# Patient Record
(demographics unavailable — no encounter records)

---

## 2024-10-14 NOTE — HISTORY OF PRESENT ILLNESS
[FreeTextEntry1] : 50y  LMP "last month" presents to establish care.      Patient was seen in the ED on 10/7 for shortness of breath and abdominal distension x2 months. She was found to have an enlarged uterus and a large adnexal mass concerning for mature vs immature ovarian teratoma. Gyn Oncology was consulted in the ED, tumor markers were collected, and patient was discharged home to follow up in Gyn Onc clinic.   Pt reports distention and abdominal discomfort starting about 2 months ago. Pt has not seen a doctor in about 15 years, however she was told recently in [facility] that she had diabetes. She moved here from [facility] 2 week ago when her mother (who is accompanying the patient today) told her to come to get better medical care for her diabetes and her growing abdomen. She states that her abdomen started growing progressively. She denies nausea, vomiting, fevers, recent weight changes, constipation, diarrhea, dysuria, incontinence. Denies abdominal pain.  GYN: Denies known history of fibroids, cysts. Reports regular menses.  She is not sure if shes ever had a pap smear. Denies hx of STI.  OBHx:  x3, SABx1, ectopic x1 s/p salpingectomy (laterality unknown ) PMH:  T2DM (diagnosed recently in [facility])  PSH: salpingectomy as above Meds: Metformin 500 QD (was prescribed in the ED, ran out of pills yesterday, reports has been tolerating without issues)  All: NKDA Fam Hx: Denies hx of ovarian, uterine, breast or colon cancer.  Social History: Denies T/E/D. Reports drinking alcohol socially every once in a while. Sexually active with 1 longtime partner, does not use contraception or barrier method. [place], [place]/7).  Health Care Maintenance: Mammogram: Never Pap Smear: Pt unsure Colonoscopy: Never  ACC: [id number]     EXAM:  MR PELVIS WAW IC   ORDERED BY: TATY MALCOLM  PROCEDURE DATE:  10/08/2024  INTERPRETATION:  CLINICAL INFORMATION: Heterogenous myometrial mass. Evaluate for Lipoleiomyosarcoma. Worsening abdominal distention.  COMPARISON: CT of the abdomen and pelvis 10/7/2024  CONTRAST/COMPLICATIONS: IV Contrast: Gadavist  8 cc administered   2 cc discarded Oral Contrast: NONE Complications: None reported at time of study completion  PROCEDURE: MRI of the pelvis was performed.  FINDINGS: UTERUS:  Intramural lesion within the right posterior uterus which measures 5.8 x 4.9 x 4.6 cm (AP by TV by CC) consistent with degenerating fibroid. Nabothian cysts. ENDOMETRIUM: 7 mm, normal thickness. JUNCTIONAL ZONE: Thickened, measuring up to 2.9 cm with multiple T2 hyperintense foci, compatible with adenomyosis.  RIGHT OVARY: Large pelvic mass, likely arising from the right adnexa measures 16.0 x 20.9 x 19.9 cm, concerning for lesion ovarian origin. Lesion demonstrates the presence of a scattered macroscopic fat component. Primary consideration is immature teratoma. Right paraovarian cyst measures up to 1.9 cm. LEFT OVARY: 2.2 x 2.9 x 0.9 cm. Simple cyst measuring up to 1.0 cm. ADNEXA: As above  BLADDER: Within normal limits.  LYMPH NODES: No pelvic lymphadenopathy.  VISUALIZED PORTIONS:  ABDOMINAL ORGANS: Within normal limits. BOWEL: Within normal limits. PERITONEUM: Small amount of pelvic ascites. No evidence of peritoneal nodularity. VESSELS: Within normal limits. ABDOMINAL WALL: Within normal limits. BONES: Within normal limits.  IMPRESSION: Large pelvic mass concerning for ilesion of right ovarian origin demonstrating the presence of scattered macroscopic fat. Primary consideration is immature teratoma. Gynecologic oncology consultation is recommended.  Fibroid uterus and evidence of adenomyosis.  --- End of Report ---  AMANDA MILAN MD; Resident Radiologist This document has been electronically signed. KARLA MONDRAGON MD; Attending Radiologist This document has been electronically signed. Oct  8 2024  3:38PM

## 2024-10-14 NOTE — PHYSICAL EXAM
[Chaperone Present] : A chaperone was present in the examining room during all aspects of the physical examination [Alert] : alert [No Acute Distress] : no acute distress [Soft] : soft [Non-tender] : non-tender [Oriented x3] : oriented x3 [Examination Of The Breasts] : a normal appearance [No Masses] : no breast masses were palpable [Labia Majora] : normal [Labia Minora] : normal [Normal] : normal [Declined] : Patient declined rectal exam [FreeTextEntry7] : large mobile mass palpated above the level of the umbilicus [FreeTextEntry6] : enlarged mobile uterus, large mobile mass palpated to above level of umbilicus

## 2024-10-14 NOTE — DISCUSSION/SUMMARY
[FreeTextEntry1] : 50y  w/ regular periods presents as follow up from the ED to establish care in GYN clinic.  Patient seen 1 week ago in the ED for new abdominal distension and found to have enlarged uterus and adnexal mass, Ddx includes mature vs immature teratoma s/p Gyn Oncology consult. Also with new diagnosis of T2DM, likely HTN with scant medical care in the last 15 years.   #adnexal mass/enlarged uterus - regular periods, pt does not report AUB - MRI as above - Tumor markers negative(10/8): AFP, , , CEA, bHCG, LDH  - Gyn Onc team emailed to facilitate close follow up for surgical planning  #well woman GYN - Pap smear/HPV collected today - G/C and STI bloodwork sent - mammogram referral provided   #T2DM, poorly controlled, new diagnosis  - A1c 9.5 - S/p Endocrinology consult in the ED, recommended starting with metformin ER 500mg BID and if tolerating increase to 1000mg ER BID, diet and lifestyle changes.  - Per patient she has been tolerating Metformin 500mg QD without any GI complaints. Reports she ran out of pills yesterday. Will prescribe  500mg BID x30 days until patient can follow up with Endocrinology. Referral provided in [employer], patient given phone number to call.  - Discussed importance of glycemic control as part of surgical planning and wound healing.   #General HCM - GI referral provided for colonoscopy - BP 160s/80s today, pt asymptomatic, PCP referral provided    D/w Dr Keon HAYES Sullivan-Santos PGY4

## 2024-10-14 NOTE — HISTORY OF PRESENT ILLNESS
[FreeTextEntry1] : 50y  LMP "last month" presents to establish care.      Patient was seen in the ED on 10/7 for shortness of breath and abdominal distension x2 months. She was found to have an enlarged uterus and a large adnexal mass concerning for mature vs immature ovarian teratoma. Gyn Oncology was consulted in the ED, tumor markers were collected, and patient was discharged home to follow up in Gyn Onc clinic.   Pt reports distention and abdominal discomfort starting about 2 months ago. Pt has not seen a doctor in about 15 years, however she was told recently in [facility] that she had diabetes. She moved here from [facility] 2 week ago when her mother (who is accompanying the patient today) told her to come to get better medical care for her diabetes and her growing abdomen. She states that her abdomen started growing progressively. She denies nausea, vomiting, fevers, recent weight changes, constipation, diarrhea, dysuria, incontinence. Denies abdominal pain.  GYN: Denies known history of fibroids, cysts. Reports regular menses.  She is not sure if shes ever had a pap smear. Denies hx of STI.  OBHx:  x3, SABx1, ectopic x1 s/p salpingectomy (laterality unknown ) PMH:  T2DM (diagnosed recently in [facility])  PSH: salpingectomy as above Meds: Metformin 500 QD (was prescribed in the ED, ran out of pills yesterday, reports has been tolerating without issues)  All: NKDA Fam Hx: Denies hx of ovarian, uterine, breast or colon cancer.  Social History: Denies T/E/D. Reports drinking alcohol socially every once in a while. Sexually active with 1 longtime partner, does not use contraception or barrier method. [place], [place]7).  Health Care Maintenance: Mammogram: Never Pap Smear: Pt unsure Colonoscopy: Never  ACC: [id number]     EXAM:  MR PELVIS WAW IC   ORDERED BY: TATY MALCOLM  PROCEDURE DATE:  10/08/2024  INTERPRETATION:  CLINICAL INFORMATION: Heterogenous myometrial mass. Evaluate for Lipoleiomyosarcoma. Worsening abdominal distention.  COMPARISON: CT of the abdomen and pelvis 10/7/2024  CONTRAST/COMPLICATIONS: IV Contrast: Gadavist  8 cc administered   2 cc discarded Oral Contrast: NONE Complications: None reported at time of study completion  PROCEDURE: MRI of the pelvis was performed.  FINDINGS: UTERUS:  Intramural lesion within the right posterior uterus which measures 5.8 x 4.9 x 4.6 cm (AP by TV by CC) consistent with degenerating fibroid. Nabothian cysts. ENDOMETRIUM: 7 mm, normal thickness. JUNCTIONAL ZONE: Thickened, measuring up to 2.9 cm with multiple T2 hyperintense foci, compatible with adenomyosis.  RIGHT OVARY: Large pelvic mass, likely arising from the right adnexa measures 16.0 x 20.9 x 19.9 cm, concerning for lesion ovarian origin. Lesion demonstrates the presence of a scattered macroscopic fat component. Primary consideration is immature teratoma. Right paraovarian cyst measures up to 1.9 cm. LEFT OVARY: 2.2 x 2.9 x 0.9 cm. Simple cyst measuring up to 1.0 cm. ADNEXA: As above  BLADDER: Within normal limits.  LYMPH NODES: No pelvic lymphadenopathy.  VISUALIZED PORTIONS:  ABDOMINAL ORGANS: Within normal limits. BOWEL: Within normal limits. PERITONEUM: Small amount of pelvic ascites. No evidence of peritoneal nodularity. VESSELS: Within normal limits. ABDOMINAL WALL: Within normal limits. BONES: Within normal limits.  IMPRESSION: Large pelvic mass concerning for ilesion of right ovarian origin demonstrating the presence of scattered macroscopic fat. Primary consideration is immature teratoma. Gynecologic oncology consultation is recommended.  Fibroid uterus and evidence of adenomyosis.  --- End of Report ---  AMANDA MILAN MD; Resident Radiologist This document has been electronically signed. KARLA MONDRAGON MD; Attending Radiologist This document has been electronically signed. Oct  8 2024  3:38PM

## 2024-11-04 NOTE — HEALTH RISK ASSESSMENT
[Poor] : ~his/her~ current health as poor [Good] : ~his/her~  mood as  good [Yes] : Yes [Monthly or less (1 pt)] : Monthly or less (1 point) [3 or 4 (1 pt)] : 3 or 4  (1 point) [Never (0 pts)] : Never (0 points) [Access to Medications] : access to medications [With Family] : lives with family [Employed] : employed [High School] : high school [] :  [Feels Safe at Home] : Feels safe at home [Fully functional (using the telephone, shopping, preparing meals, housekeeping, doing laundry, using] : Fully functional and needs no help or supervision to perform IADLs (using the telephone, shopping, preparing meals, housekeeping, doing laundry, using transportation, managing medications and managing finances) [Never] : Never [NO] : No [0] : 2) Feeling down, depressed, or hopeless: Not at all (0) [PHQ-2 Negative - No further assessment needed] : PHQ-2 Negative - No further assessment needed [de-identified] : None [Reports changes in hearing] : Reports no changes in hearing [Reports changes in vision] : Reports no changes in vision [Reports changes in dental health] : Reports no changes in dental health [FreeTextEntry2] : [place]

## 2024-11-04 NOTE — PHYSICAL EXAM
[No Acute Distress] : no acute distress [Well Nourished] : well nourished [Well Developed] : well developed [Normal Sclera/Conjunctiva] : normal sclera/conjunctiva [PERRL] : pupils equal round and reactive to light [EOMI] : extraocular movements intact [Normal Outer Ear/Nose] : the outer ears and nose were normal in appearance [No Lymphadenopathy] : no lymphadenopathy [No Respiratory Distress] : no respiratory distress  [Clear to Auscultation] : lungs were clear to auscultation bilaterally [Normal Rate] : normal rate  [Regular Rhythm] : with a regular rhythm [Normal S1, S2] : normal S1 and S2 [No Edema] : there was no peripheral edema [No CVA Tenderness] : no CVA  tenderness [No Joint Swelling] : no joint swelling [de-identified] : Missing front teeth  [de-identified] : Distended abdomen, soft and nontender  [de-identified] : acanthosis nigricans to neck

## 2024-11-04 NOTE — HEALTH RISK ASSESSMENT
[Poor] : ~his/her~ current health as poor [Good] : ~his/her~  mood as  good [Yes] : Yes [Monthly or less (1 pt)] : Monthly or less (1 point) [3 or 4 (1 pt)] : 3 or 4  (1 point) [Never (0 pts)] : Never (0 points) [Access to Medications] : access to medications [With Family] : lives with family [Employed] : employed [High School] : high school [] :  [Feels Safe at Home] : Feels safe at home [Fully functional (using the telephone, shopping, preparing meals, housekeeping, doing laundry, using] : Fully functional and needs no help or supervision to perform IADLs (using the telephone, shopping, preparing meals, housekeeping, doing laundry, using transportation, managing medications and managing finances) [Never] : Never [NO] : No [0] : 2) Feeling down, depressed, or hopeless: Not at all (0) [PHQ-2 Negative - No further assessment needed] : PHQ-2 Negative - No further assessment needed [de-identified] : None [Reports changes in hearing] : Reports no changes in hearing [Reports changes in vision] : Reports no changes in vision [Reports changes in dental health] : Reports no changes in dental health [FreeTextEntry2] : [place]

## 2024-11-04 NOTE — HISTORY OF PRESENT ILLNESS
[FreeTextEntry1] : Establish Care [de-identified] : Kashmir Huerta is a 50 year old female with PMH of DM who presents to clinic to establish care. Patient referred by OB/GYN provider for establishing care and management of DM/HTN. Patient reports she came to [place] for visiting about 2 weeks ago. Patient states she went to the Emergency Department because of shortness of breath and abdominal distension. Patient was found to have adnexal mass and seen by Gynecology. She has follow up appointment scheduled with Gyn Oncology for surgical evaluation. Patient does not take any prescribed medications. She reports a history of hypertension on mother's side and diabetes on her fathers side. She reports having a tubal ligation prior, no other surgeries. She denies any tobacco or recreational drug use.

## 2024-11-04 NOTE — END OF VISIT
[] : Resident [FreeTextEntry3] : Pt seen with  in the room. Reports many months with abdominal symptoms. Bowels are normal, 2 times daily, a bit loose. Still menstruating, in fact reports currently with her menses, and no menopausal symptoms. On exam, NAD, obesity, abdominal exam reveals distention, although soft, with suggestion of right infraumbilical fullness, cannot palpate a discreet mass, non-tender to palpation, remainder of exam WNL. Agree with plan, will wor tko control BP and DM prior to planned surgical procedure with quick titration of medication.

## 2024-11-04 NOTE — REVIEW OF SYSTEMS
[Fever] : no fever [Chills] : no chills [Discharge] : no discharge [Vision Problems] : no vision problems [Earache] : no earache [Hearing Loss] : no hearing loss [Nasal Discharge] : no nasal discharge [Chest Pain] : no chest pain [Lower Ext Edema] : no lower extremity edema [Shortness Of Breath] : no shortness of breath [Cough] : no cough [Dyspnea on Exertion] : no dyspnea on exertion [Abdominal Pain] : abdominal pain [Constipation] : no constipation [Diarrhea] : diarrhea [Vomiting] : no vomiting [Dysuria] : no dysuria [Hematuria] : no hematuria [Joint Pain] : no joint pain [Muscle Pain] : no muscle pain [Itching] : no itching [Skin Rash] : no skin rash

## 2024-11-04 NOTE — HEALTH RISK ASSESSMENT
[Poor] : ~his/her~ current health as poor [Good] : ~his/her~  mood as  good [Yes] : Yes [Monthly or less (1 pt)] : Monthly or less (1 point) [3 or 4 (1 pt)] : 3 or 4  (1 point) [Never (0 pts)] : Never (0 points) [Access to Medications] : access to medications [With Family] : lives with family [Employed] : employed [High School] : high school [] :  [Feels Safe at Home] : Feels safe at home [Fully functional (using the telephone, shopping, preparing meals, housekeeping, doing laundry, using] : Fully functional and needs no help or supervision to perform IADLs (using the telephone, shopping, preparing meals, housekeeping, doing laundry, using transportation, managing medications and managing finances) [Never] : Never [NO] : No [0] : 2) Feeling down, depressed, or hopeless: Not at all (0) [PHQ-2 Negative - No further assessment needed] : PHQ-2 Negative - No further assessment needed [de-identified] : None [Reports changes in hearing] : Reports no changes in hearing [Reports changes in vision] : Reports no changes in vision [Reports changes in dental health] : Reports no changes in dental health [FreeTextEntry2] : [place]

## 2024-11-04 NOTE — HISTORY OF PRESENT ILLNESS
[FreeTextEntry1] : Establish Care [de-identified] : Kashmir Huerta is a 50 year old female with PMH of DM who presents to clinic to establish care. Patient referred by OB/GYN provider for establishing care and management of DM/HTN. Patient reports she came to [place] for visiting about 2 weeks ago. Patient states she went to the Emergency Department because of shortness of breath and abdominal distension. Patient was found to have adnexal mass and seen by Gynecology. She has follow up appointment scheduled with Gyn Oncology for surgical evaluation. Patient does not take any prescribed medications. She reports a history of hypertension on mother's side and diabetes on her fathers side. She reports having a tubal ligation prior, no other surgeries. She denies any tobacco or recreational drug use.

## 2024-11-04 NOTE — ASSESSMENT
[FreeTextEntry1] : Case discussed with Dr. Valdes  RTC in 2 weeks for DM follow up and 5 weeks with Dr. Casanova for follow up  Kal Casanova  Internal Medicine PGY-2 [employer] [facility]

## 2024-11-04 NOTE — PHYSICAL EXAM
[No Acute Distress] : no acute distress [Well Nourished] : well nourished [Well Developed] : well developed [Normal Sclera/Conjunctiva] : normal sclera/conjunctiva [PERRL] : pupils equal round and reactive to light [EOMI] : extraocular movements intact [Normal Outer Ear/Nose] : the outer ears and nose were normal in appearance [No Lymphadenopathy] : no lymphadenopathy [No Respiratory Distress] : no respiratory distress  [Clear to Auscultation] : lungs were clear to auscultation bilaterally [Normal Rate] : normal rate  [Regular Rhythm] : with a regular rhythm [Normal S1, S2] : normal S1 and S2 [No Edema] : there was no peripheral edema [No CVA Tenderness] : no CVA  tenderness [No Joint Swelling] : no joint swelling [de-identified] : Missing front teeth  [de-identified] : Distended abdomen, soft and nontender  [de-identified] : acanthosis nigricans to neck

## 2024-11-04 NOTE — PHYSICAL EXAM
[No Acute Distress] : no acute distress [Well Nourished] : well nourished [Well Developed] : well developed [Normal Sclera/Conjunctiva] : normal sclera/conjunctiva [PERRL] : pupils equal round and reactive to light [EOMI] : extraocular movements intact [Normal Outer Ear/Nose] : the outer ears and nose were normal in appearance [No Lymphadenopathy] : no lymphadenopathy [No Respiratory Distress] : no respiratory distress  [Clear to Auscultation] : lungs were clear to auscultation bilaterally [Normal Rate] : normal rate  [Regular Rhythm] : with a regular rhythm [Normal S1, S2] : normal S1 and S2 [No Edema] : there was no peripheral edema [No CVA Tenderness] : no CVA  tenderness [No Joint Swelling] : no joint swelling [de-identified] : Missing front teeth  [de-identified] : Distended abdomen, soft and nontender  [de-identified] : acanthosis nigricans to neck

## 2024-11-06 NOTE — REVIEW OF SYSTEMS
[Diabetes] : diabetes mellitus [Negative] : Musculoskeletal [Hot Flashes] : no hot flashes [Dysuria] : no dysuria [Vaginal Discharge] : no vaginal discharge [Abn Vag Bleeding] : no abnormal vaginal bleeding [Incontinence] : no incontinence [Recent Wt Loss___ Lbs] : no recent weight loss [Chest Pain] : no chest pain [Wheezing] : no wheezing [SOB on Exertion] : no shortness of breath during exertion [Diarrhea] : no diarrhea [Nausea] : no nausea/vomitting

## 2024-11-06 NOTE — ASSESSMENT
[FreeTextEntry1] : 50y  w/ regular periods presents to establish care in GYN ONC clinic for surgical management of enlarged uterus and adnexal mass/pelvicmass  #Adnexal/pelvic mass - MRI as above - Tumor markers within normal limits (10/8): AFP, , , CEA, bhCG, LDH - Booking sheet completed for exploratory laparotomy, total abdominal hysterectomy, bilateral salpingoophorectomy, possible staging.  - Will require medical clearance by PCP for T2DM and HTN  Evaluated with HERNÁN Stover and Dr. Goldberg Amy Ng, PGY-2

## 2024-11-06 NOTE — PAST MEDICAL HISTORY
[Menstruating] : The patient is menstruating [Regular Cycle Intervals] : have been regular [Total Preg ___] : G[unfilled] [Live Births ___] : P[unfilled]  [Ectopics ___] : ectopic pregnancies: [unfilled]  [FreeTextEntry2] : s/p open Salpingectomy (laterality unknown)

## 2024-11-06 NOTE — HISTORY OF PRESENT ILLNESS
[FreeTextEntry1] : 50y  (LMP last week) presenting as new patient for surgical management of R complex mass found on MRI Pelvis during ED visit 10/7/24.  Originally presented to the ED on 10/7 for shortness of breath and abdominal distension x2 months.  She was found to have symptomatic anemia Hgb 7.2 for which she received 1 u pRBC. Imaging significant enlarged uterus and a large adnexal mass concerning for mature vs immature ovarian teratoma (see imaging report below).  Gyn Oncology was consulted in the ED, tumor markers were collected and were largely WNL (see below), and patient was referred to this clinic.  Pt reports daily left-sided abdominal pain rated 8/10.  Denies any exacerbating factors.  Relieved with Tylenol PRN.  Denies urinary or GI complaints.  Denies fevers, chills, recent weight loss, night sweats, nausea, vomiting.    Of note, the patient lives in [facility] and is visiting the [place] for treatment of her abdominal pain. Has not seen a doctor/GYN in 15 years.  GYN: Denies known history of fibroids, cysts.  Reports regular menses.  She is not sure if shes ever had a pap smear. Denies hx of STI. OBHx:  x3 (, , '10),  Ectopic x1 s/p open salpingectomy (laterality unknown ) PMH: HTN, T2DM, KENYON  PSH: Open salpingectomy (laterality unknown) Meds: Metformin 500 BID, Amlodipine 5mg QD, Fe All: Advil (angioedema) Fam Hx: Denies hx of ovarian, uterine, breast or colon cancer. Social History: Denies T/E/D.  Reports drinking alcohol socially every once in a while.  Sexually active with 1 longtime partner, does not use contraception or barrier method. [place] recently came [place] ([place]esterday, 10/7).  Health Care Maintenance: Mammogram: Referral provided by PCP (has yet to make appointment) Pap Smear: NILM/neg HRHPV (10/14/24) Colonoscopy: Referral provided by PCP (has yet to make appointment)  ACC: [id number] EXAM: MR PELVIS WAW IC ORDERED BY: TATY MALCOLM  PROCEDURE DATE: 10/08/2024  INTERPRETATION: CLINICAL INFORMATION: Heterogenous myometrial mass. Evaluate for Lipoleiomyosarcoma. Worsening abdominal distention.  COMPARISON: CT of the abdomen and pelvis 10/7/2024  CONTRAST/COMPLICATIONS: IV Contrast: Gadavist 8 cc administered 2 cc discarded Oral Contrast: NONE Complications: None reported at time of study completion  PROCEDURE: MRI of the pelvis was performed.  FINDINGS: UTERUS: Intramural lesion within the right posterior uterus which measures 5.8 x 4.9 x 4.6 cm (AP by TV by CC) consistent with degenerating fibroid. Nabothian cysts. ENDOMETRIUM: 7 mm, normal thickness. JUNCTIONAL ZONE: Thickened, measuring up to 2.9 cm with multiple T2 hyperintense foci, compatible with adenomyosis.  RIGHT OVARY: Large pelvic mass, likely arising from the right adnexa measures 16.0 x 20.9 x 19.9 cm, concerning for lesion ovarian origin. Lesion demonstrates the presence of a scattered macroscopic fat component. Primary consideration is immature teratoma. Right paraovarian cyst measures up to 1.9 cm. LEFT OVARY: 2.2 x 2.9 x 0.9 cm. Simple cyst measuring up to 1.0 cm. ADNEXA: As above  BLADDER: Within normal limits.  LYMPH NODES: No pelvic lymphadenopathy.  VISUALIZED PORTIONS:  ABDOMINAL ORGANS: Within normal limits. BOWEL: Within normal limits. PERITONEUM: Small amount of pelvic ascites. No evidence of peritoneal nodularity. VESSELS: Within normal limits. ABDOMINAL WALL: Within normal limits. BONES: Within normal limits.  IMPRESSION: Large pelvic mass concerning for ilesion of right ovarian origin demonstrating the presence of scattered macroscopic fat. Primary consideration is immature teratoma. Gynecologic oncology consultation is recommended.  Fibroid uterus and evidence of adenomyosis.  --- End of Report ---  AMANDA MILAN MD; Resident Radiologist This document has been electronically signed. KARLA MONDRAGON MD; Attending Radiologist This document has been electronically signed. Oct 8 2024 3:38PM  Tumor Markers (10/8/2024): - AFP: 2.4 - CA-125: 25 - CA 19-9: 9 - CEA: 1.1 - bHCG: <1 - LDH: 171

## 2024-11-14 NOTE — PHYSICAL EXAM
[Soft] : abdomen soft [Non Tender] : non-tender [Normal] : affect was normal and insight and judgment were intact [de-identified] : distended

## 2024-11-14 NOTE — ASSESSMENT
[Patient Optimized for Surgery] : Patient optimized for surgery [No Further Testing Recommended] : no further testing recommended [Modify medications prior to procedure] : Modify medications prior to procedure [FreeTextEntry7] : Hold metformin on day of procedure , hold Jardiance 5 days before, Avoid NSAID, MVI 1 week

## 2024-11-14 NOTE — PHYSICAL EXAM
[Soft] : abdomen soft [Non Tender] : non-tender [Normal] : affect was normal and insight and judgment were intact [de-identified] : distended

## 2024-11-14 NOTE — HISTORY OF PRESENT ILLNESS
[No Pertinent Cardiac History] : no history of aortic stenosis, atrial fibrillation, coronary artery disease, recent myocardial infarction, or implantable device/pacemaker [No Pertinent Pulmonary History] : no history of asthma, COPD, sleep apnea, or smoking [No Adverse Anesthesia Reaction] : no adverse anesthesia reaction in self or family member [Diabetes] : diabetes [(Patient denies any chest pain, claudication, dyspnea on exertion, orthopnea, palpitations or syncope)] : Patient denies any chest pain, claudication, dyspnea on exertion, orthopnea, palpitations or syncope [Excellent (>10 METs)] : Excellent (>10 METs) [Parent] : parent [Family Member] : no family member with adverse anesthesia reaction/sudden death [Self] : no previous adverse anesthesia reaction [Chronic Anticoagulation] : no chronic anticoagulation [Chronic Kidney Disease] : no chronic kidney disease [FreeTextEntry1] : Ex lap [FreeTextEntry2] : 11/25/24 [FreeTextEntry4] : 50yF w/ PMHx of Anemia, T2DM, HTN presenting for pre-op clearance for surgical management of R complex mass found on MRI Pelvis during ED visit 10/7/24. Patient states overall she has been feeling well, tolerating amlodipine and Metformin well. Presented to the ED on 10/7 for shortness of breath and abdominal distension x2 months and found to have symptomatic anemia Hgb 7.2 for which she received 1 u pRBC. 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 [FreeTextEntry7] : EKG completed 10/7/24 unremarkable

## 2024-11-14 NOTE — END OF VISIT
[] : Resident [FreeTextEntry3] : This is a 50yF w/ PMHx of Anemia, T2DM, HTN , R complex mass found on MRI Pelvis- 10/2024 seen for medical optimization  KENYON - will need perioperative blood transfusion  DM - hold Jardiance 5 days before surgery , Hold metformin on day of surgery  HTN cont amlodpine 5 add losartan 25 qd  -PT at optimal condition for proposed procedure

## 2024-12-05 NOTE — REVIEW OF SYSTEMS
[Fever] : no fever [Chills] : no chills [Night Sweats] : no night sweats [Discharge] : no discharge [Vision Problems] : no vision problems [Earache] : no earache [Nasal Discharge] : no nasal discharge [Sore Throat] : no sore throat [Chest Pain] : no chest pain [Palpitations] : no palpitations [Shortness Of Breath] : no shortness of breath [Cough] : no cough [Dyspnea on Exertion] : no dyspnea on exertion [Abdominal Pain] : no abdominal pain [Nausea] : no nausea [Constipation] : no constipation [Vomiting] : no vomiting [Dysuria] : no dysuria [Hematuria] : no hematuria [Joint Pain] : no joint pain [Muscle Pain] : no muscle pain [Itching] : no itching [Skin Rash] : no skin rash

## 2024-12-05 NOTE — PHYSICAL EXAM
[No Acute Distress] : no acute distress [Well Nourished] : well nourished [Normal Sclera/Conjunctiva] : normal sclera/conjunctiva [PERRL] : pupils equal round and reactive to light [EOMI] : extraocular movements intact [Normal Outer Ear/Nose] : the outer ears and nose were normal in appearance [Normal Oropharynx] : the oropharynx was normal [Supple] : supple [No Respiratory Distress] : no respiratory distress  [Clear to Auscultation] : lungs were clear to auscultation bilaterally [Normal Rate] : normal rate  [Regular Rhythm] : with a regular rhythm [No Edema] : there was no peripheral edema [Soft] : abdomen soft [Non Tender] : non-tender [No Joint Swelling] : no joint swelling [No Rash] : no rash [de-identified] : Patient s/p ex-lap with staples present down from umbilicus. Skin is clean, dry and intact

## 2024-12-05 NOTE — PHYSICAL EXAM
[No Acute Distress] : no acute distress [Well Nourished] : well nourished [Normal Sclera/Conjunctiva] : normal sclera/conjunctiva [PERRL] : pupils equal round and reactive to light [EOMI] : extraocular movements intact [Normal Outer Ear/Nose] : the outer ears and nose were normal in appearance [Normal Oropharynx] : the oropharynx was normal [Supple] : supple [No Respiratory Distress] : no respiratory distress  [Clear to Auscultation] : lungs were clear to auscultation bilaterally [Normal Rate] : normal rate  [Regular Rhythm] : with a regular rhythm [No Edema] : there was no peripheral edema [Soft] : abdomen soft [Non Tender] : non-tender [No Joint Swelling] : no joint swelling [No Rash] : no rash [de-identified] : Patient s/p ex-lap with staples present down from umbilicus. Skin is clean, dry and intact

## 2024-12-05 NOTE — HISTORY OF PRESENT ILLNESS
[FreeTextEntry1] : Follow Up  [de-identified] : Kashmir Huerta is a 50 year old female with PMH of DM, HTN and adnexal mass who presents to clinic for follow up.   #Adnexal Mass  Patient s/p ex-lap with removal of mass on 11/26. Patient endorses doing well since surgery and has follow up appointment upcoming.  #DM Patient endorses compliance with metformin. States she has made changes to her diet to eat more fruits/vegetables and less fried food and meats.  #HTN Patient endorses checking BP at home with systolic in 120/130s. She endorses taking amlodipine and losartan.

## 2024-12-05 NOTE — ASSESSMENT
[FreeTextEntry1] : Case discussed with Dr. Castanon  RTC in 1-2 months for follow up  Kal Casanova  Internal Medicine PGY-2 [facility]

## 2024-12-05 NOTE — HISTORY OF PRESENT ILLNESS
[FreeTextEntry1] : Follow Up  [de-identified] : Kashmir Huerta is a 50 year old female with PMH of DM, HTN and adnexal mass who presents to clinic for follow up.   #Adnexal Mass  Patient s/p ex-lap with removal of mass on 11/26. Patient endorses doing well since surgery and has follow up appointment upcoming.  #DM Patient endorses compliance with metformin. States she has made changes to her diet to eat more fruits/vegetables and less fried food and meats.  #HTN Patient endorses checking BP at home with systolic in 120/130s. She endorses taking amlodipine and losartan.

## 2024-12-18 NOTE — PHYSICAL EXAM
[Chaperone Present] : A chaperone was present in the examining room during all aspects of the physical examination [[id number]] : A chaperone was present during the pelvic exam. [Normal] : External genitalia: Normal, no lesions appreciated [Absent] : Cervix: Absent [de-identified] : Midline vertical incision healing well, no drainage or erythema. No pain to touch.  [de-identified] : Speculum exam with physiological discharge with intact  cuff.  [de-identified] : Vaginal cuff intact.

## 2024-12-18 NOTE — REASON FOR VISIT
[FreeTextEntry1] : 50y  s/p EUA, Ex-lap, BRADLEY, BSO, omentectomy, left external iliac lymph node dissection, b/l TAP blocks on .  Pathology showing Lipoleiomyoma with degenerative changes.  Pt recovering well post-operatively.  Denies fever, chills, abdominal pain.  Not having vaginal bleeding, or abnormal vaginal discharge.   OBHx:  x3 (, , '10), Ectopic x1 s/p open salpingectomy (laterality unknown ) PMH: HTN, T2DM, KENYON PSH: Open salpingectomy (laterality unknown) Meds: Metformin 500 BID, Amlodipine 5mg QD, Fe All: Advil (angioedema) Fam Hx: Denies hx of ovarian, uterine, breast or colon cancer. Social History: Denies T/E/D.   Specimen(s) Submitted 1-Uterine mass 2-Uterus, cervix, bilateral tubes and ovaries 3-Left external iliac lymph node 4-Omentum  Final Diagnosis 1.  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 -   See note. 2.  Uterus, cervix, bilateral ovaries and fallopian tubes (hysterectomy and bilateral salpingo-oophorectomy): -   Endometrium: Proliferative endometrium. -   Myometrium: Cellular leiomyoma. Leiomyomas with degenerative changes. Extensive adenomyosis. -   Cervix: Benign. -   Bilateral ovaries and fallopian tubes: Benign.  3.  Left external iliac lymph node (lymphadenectomy): -   3 lymph nodes, negative for tumor.  4.  Omentum (omentectomy): -   Benign.

## 2024-12-18 NOTE — DISCUSSION/SUMMARY
[Reviewed Clinical Lab Test(s)] : Results of clinical tests were reviewed. [FreeTextEntry1] : 50y s/p EUA, Ex-lap, BRADLEY, BSO, omentectomy, left external iliac lymph node dissection, b/l TAP blocks on 11/26. Pathology showing Lipoleiomyoma with degenerative changes.  Pt recovering well post-operatively.  Denies fever, chills, abdominal pain.  Not having vaginal bleeding, or abnormal vaginal discharge. -Path dw patient, benign findings recommended routine GYN follow up. -Patient with mammo and colonoscopy referral from PCP, encouraged to f/u. -Patient wants to go back to [facility] mid-January and has to for visa purposes.  No contraindications from a surgical perspective. -Healing well, vaginal cuff intact.  -Return precautions given concerning fevers, pain, vaginal bleeding and discharge. -Instructed she does not need  ppx Apixaban given benign path.  DTmoe PGY3 Seen and d/w Dr. Quintero and Dr. Goldberg